# Patient Record
Sex: MALE
[De-identification: names, ages, dates, MRNs, and addresses within clinical notes are randomized per-mention and may not be internally consistent; named-entity substitution may affect disease eponyms.]

---

## 2020-09-16 PROBLEM — Z00.00 ENCOUNTER FOR PREVENTIVE HEALTH EXAMINATION: Status: ACTIVE | Noted: 2020-09-16

## 2020-09-18 ENCOUNTER — APPOINTMENT (OUTPATIENT)
Dept: ORTHOPEDIC SURGERY | Facility: CLINIC | Age: 35
End: 2020-09-18
Payer: MEDICAID

## 2020-09-18 VITALS
HEART RATE: 74 BPM | DIASTOLIC BLOOD PRESSURE: 84 MMHG | WEIGHT: 240 LBS | BODY MASS INDEX: 33.6 KG/M2 | HEIGHT: 71 IN | SYSTOLIC BLOOD PRESSURE: 141 MMHG

## 2020-09-18 VITALS — TEMPERATURE: 97.9 F

## 2020-09-18 DIAGNOSIS — M54.5 LOW BACK PAIN: ICD-10-CM

## 2020-09-18 PROCEDURE — 99204 OFFICE O/P NEW MOD 45 MIN: CPT

## 2020-09-18 PROCEDURE — 72070 X-RAY EXAM THORAC SPINE 2VWS: CPT

## 2020-09-18 PROCEDURE — 72100 X-RAY EXAM L-S SPINE 2/3 VWS: CPT

## 2020-09-18 RX ORDER — MELOXICAM 15 MG/1
15 TABLET ORAL
Qty: 30 | Refills: 1 | Status: ACTIVE | COMMUNITY
Start: 2020-09-18 | End: 1900-01-01

## 2020-09-18 RX ORDER — CYCLOBENZAPRINE HYDROCHLORIDE 5 MG/1
5 TABLET, FILM COATED ORAL 3 TIMES DAILY
Qty: 90 | Refills: 1 | Status: ACTIVE | COMMUNITY
Start: 2020-09-18 | End: 1900-01-01

## 2020-09-23 ENCOUNTER — APPOINTMENT (OUTPATIENT)
Dept: ORTHOPEDIC SURGERY | Facility: CLINIC | Age: 35
End: 2020-09-23

## 2020-10-09 ENCOUNTER — APPOINTMENT (OUTPATIENT)
Dept: ORTHOPEDIC SURGERY | Facility: CLINIC | Age: 35
End: 2020-10-09
Payer: OTHER MISCELLANEOUS

## 2020-10-09 VITALS — WEIGHT: 240 LBS | BODY MASS INDEX: 34.36 KG/M2 | HEIGHT: 70 IN

## 2020-10-09 PROCEDURE — 73030 X-RAY EXAM OF SHOULDER: CPT | Mod: RT

## 2020-10-09 PROCEDURE — 99203 OFFICE O/P NEW LOW 30 MIN: CPT

## 2020-10-09 NOTE — PHYSICAL EXAM
[de-identified] : General Exam\par \par Well developed, well nourished\par No apparent distress\par Oriented to person, place, and time\par Mood: Normal\par Affect: Normal\par Balance and coordination: Normal\par Gait: Normal\par \par Left shoulder exam\par \par Inspection: No swelling, ecchymosis or gross deformity.\par Skin: No masses, No lesions\par Tenderness: No bicipital tenderness, no tenderness to the greater tuberosity/RTC insertion, no anterior shoulder/lesser tuberosity tenderness. No tenderness SC joint, clavicle, AC joint.\par ROM: 160/60/T6\par instability: pos apprehension, 2+ load and shift\par Impingement tests: Positive Parra\par AC Joint: no pain with cross arm testing\par Biceps: Negative speed\par Strength: 5/5 abduction, external rotation, and internal rotation\par Neuro: AIN, PIN, Ulnar nerve motor intact\par Sensation: Intact to light touch in radial, median, ulnar, and axillary nerve distributions\par Vasc: 2+ radial pulse\par  [de-identified] : \par The following radiographs were ordered and read by me during this patients visit. I reviewed each radiograph in detail with the patient and discussed the findings as highlighted below. \par \par AP scapular Y. axillary greshey views of the right shoulder obtained today. Glenohumeral joint is reduced. His Hill-Sachs impaction fracture.\par

## 2020-10-09 NOTE — DISCUSSION/SUMMARY
[de-identified] : \par 35-year-old male with recurrent right shoulder instability. There is a Hill-Sachs impaction fracture there is a question of glenoid bone loss. A little further evaluate his bony deficiency in terms of decide which surgical approach would be best for him on arthroscopic or open and with a bony augmentation as needed. In order to assess his soft tissues as well as potential glenoid bone loss and potential engagement of his Hill-Sachs lesion and recommending a CT arthrogram to assess both his bony and soft tissue damage. He will followup after CT arthrogram. all questions answered

## 2020-10-09 NOTE — HISTORY OF PRESENT ILLNESS
[de-identified] : 35-year-old male recurrent right shoulder instability. He reports he sustained his first dislocation in high school and has had multiple multiple dislocations since. He sustained his most recent dislocation 3 months ago at work when he slipped on a step. He works as a  and he is right-hand-dominant. He was significant apprehension and shoulder no pain at this time. He denies numbness and tingling\par \par The patient's past medical history, past surgical history, medications, allergies, and social history were reviewed by me today with the patient and documented accordingly. In addition, the patient's family history, which is noncontributory to this visit, was also reviewed.\par

## 2020-10-11 ENCOUNTER — FORM ENCOUNTER (OUTPATIENT)
Age: 35
End: 2020-10-11

## 2020-10-27 ENCOUNTER — NON-APPOINTMENT (OUTPATIENT)
Age: 35
End: 2020-10-27

## 2020-12-18 ENCOUNTER — APPOINTMENT (OUTPATIENT)
Dept: ORTHOPEDIC SURGERY | Facility: CLINIC | Age: 35
End: 2020-12-18
Payer: OTHER MISCELLANEOUS

## 2021-01-06 ENCOUNTER — APPOINTMENT (OUTPATIENT)
Dept: ORTHOPEDIC SURGERY | Facility: CLINIC | Age: 36
End: 2021-01-06
Payer: OTHER MISCELLANEOUS

## 2021-01-06 DIAGNOSIS — M25.311 OTHER INSTABILITY, RIGHT SHOULDER: ICD-10-CM

## 2021-01-06 PROCEDURE — 99213 OFFICE O/P EST LOW 20 MIN: CPT

## 2021-01-06 PROCEDURE — 99072 ADDL SUPL MATRL&STAF TM PHE: CPT

## 2021-01-06 NOTE — PHYSICAL EXAM
[de-identified] : right shoulder exam\par \par Inspection: No swelling, ecchymosis or gross deformity.\par Skin: No masses, No lesions\par Tenderness: No bicipital tenderness, no tenderness to the greater tuberosity/RTC insertion, no anterior shoulder/lesser tuberosity tenderness. No tenderness SC joint, clavicle, AC joint.\par ROM: 160/60/T6\par instability: pos apprehension, 2+ load and shift\par Impingement tests: Positive Parra\par AC Joint: no pain with cross arm testing\par Biceps: Negative speed\par Strength: 5/5 abduction, external rotation, and internal rotation\par Neuro: AIN, PIN, Ulnar nerve motor intact\par Sensation: Intact to light touch in radial, median, ulnar, and axillary nerve distributions\par Vasc: 2+ radial pulse\par  [de-identified] : CT scan of the right shoulder was reviewed. There sequelae of dislocation with Hill-Sachs impaction fracture and glenoid fracture. There is approximately 23% glenoid bone loss.\par \par

## 2021-01-06 NOTE — HISTORY OF PRESENT ILLNESS
[de-identified] : 35-year-old male recurrent right shoulder instability. He reports he sustained his first dislocation in high school and has had multiple multiple dislocations since. He sustained his most recent dislocation 3 months ago at work when he slipped on a step. He works as a  and he is right-hand-dominant. He was significant apprehension and shoulder no pain at this time. He denies numbness and tingling\par here today to review CT scan\par \par The patient's past medical history, past surgical history, medications, allergies, and social history were reviewed by me today with the patient and documented accordingly. In addition, the patient's family history, which is noncontributory to this visit, was also reviewed.

## 2021-01-29 ENCOUNTER — NON-APPOINTMENT (OUTPATIENT)
Age: 36
End: 2021-01-29

## 2022-08-02 ENCOUNTER — NON-APPOINTMENT (OUTPATIENT)
Age: 37
End: 2022-08-02

## 2023-09-14 NOTE — DISCUSSION/SUMMARY
[de-identified] : Chronic recurrent right shoulder instability. He has significant bone loss on both the humeral as well as the glenoid side. Given his age and activity level he is nearly 100% chance of future dislocation of further nonoperative treatment. We discussed surgical treatment. We discussed both arthroscopic and open options. We discussed the possibility of shoulder arthroscopy with labral repair given his age activity level and bone loss he would have greater than 25% failure with arthroscopic surgery alone. We discussed bony augmentation with a right shoulder latarjet coracoid transfer given his engaging Hill-Sachs lesion and greater than 20% glenoid bone loss. We discussed the surgery postoperative protocol instructions at length. Risks benefits alternatives were discussed including but not limited to risks such as bleeding infection nerve and vessel injury continued pain stiffness need for future surgery medical complications such as DVT or PE anesthesia complications. All questions were answered and we will schedule as soon as possible Tranexamic Acid Pregnancy And Lactation Text: It is unknown if this medication is safe during pregnancy or breast feeding.